# Patient Record
Sex: MALE | Race: BLACK OR AFRICAN AMERICAN | NOT HISPANIC OR LATINO | Employment: FULL TIME | URBAN - METROPOLITAN AREA
[De-identification: names, ages, dates, MRNs, and addresses within clinical notes are randomized per-mention and may not be internally consistent; named-entity substitution may affect disease eponyms.]

---

## 2018-05-30 DIAGNOSIS — M54.50 LUMBAR SPINE PAIN: Primary | ICD-10-CM

## 2018-06-05 ENCOUNTER — HOSPITAL ENCOUNTER (OUTPATIENT)
Dept: RADIOLOGY | Facility: HOSPITAL | Age: 23
Discharge: HOME | End: 2018-06-05
Attending: PHYSICIAN ASSISTANT
Payer: COMMERCIAL

## 2018-06-05 ENCOUNTER — OFFICE VISIT (OUTPATIENT)
Dept: ORTHOPEDICS | Facility: CLINIC | Age: 23
End: 2018-06-05
Attending: PHYSICIAN ASSISTANT
Payer: COMMERCIAL

## 2018-06-05 VITALS — BODY MASS INDEX: 33.39 KG/M2 | HEIGHT: 69 IN | WEIGHT: 225.44 LBS

## 2018-06-05 DIAGNOSIS — M43.00 SPONDYLOLYSIS: ICD-10-CM

## 2018-06-05 DIAGNOSIS — M54.50 LUMBAR SPINE PAIN: ICD-10-CM

## 2018-06-05 DIAGNOSIS — M43.10 SPONDYLOLISTHESIS, UNSPECIFIED SPINAL REGION: Primary | ICD-10-CM

## 2018-06-05 PROCEDURE — 99204 OFFICE O/P NEW MOD 45 MIN: CPT | Mod: S$GLB,,, | Performed by: PHYSICIAN ASSISTANT

## 2018-06-05 PROCEDURE — 72100 X-RAY EXAM L-S SPINE 2/3 VWS: CPT | Mod: 26,,, | Performed by: RADIOLOGY

## 2018-06-05 PROCEDURE — 99999 PR PBB SHADOW E&M-EST. PATIENT-LVL III: CPT | Mod: PBBFAC,,, | Performed by: PHYSICIAN ASSISTANT

## 2018-06-05 PROCEDURE — 3008F BODY MASS INDEX DOCD: CPT | Mod: CPTII,S$GLB,, | Performed by: PHYSICIAN ASSISTANT

## 2018-06-05 PROCEDURE — 72100 X-RAY EXAM L-S SPINE 2/3 VWS: CPT | Mod: TC

## 2018-06-05 PROCEDURE — 72120 X-RAY BEND ONLY L-S SPINE: CPT | Mod: 26,,, | Performed by: RADIOLOGY

## 2018-06-05 RX ORDER — METHOCARBAMOL 750 MG/1
750 TABLET, FILM COATED ORAL NIGHTLY PRN
Qty: 30 TABLET | Refills: 0 | Status: SHIPPED | OUTPATIENT
Start: 2018-06-05 | End: 2020-11-24

## 2018-06-05 NOTE — PROGRESS NOTES
DATE: 6/5/2018  PATIENT: Miguel Velasco    Supervising Physician: Antonio Berger M.D.    CHIEF COMPLAINT: back pain    HISTORY:  Miguel Velasco is a 22 y.o. 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 The pain has been present for 4 or 5 years.  He was diagnosed with isthmic spondylolisthesis of L5/S1 in high school.  He has not had any treatment.  The patient describes the pain as aching.  The pain is worse with getting out of bed and lying down and improved by activity. There is no associated numbness and tingling. There is no subjective weakness. Prior treatments have included nothing in particular, no NSAIDs, physical therapy, ESIs or surgery.    The patient denies myelopathic symptoms such as handwriting changes or difficulty with buttons/coins/keys. Denies perineal paresthesias, bowel/bladder dysfunction.    PAST MEDICAL/SURGICAL HISTORY:  History reviewed. No pertinent past medical history.  Past Surgical History:   Procedure Laterality Date    none         Current Medications:   Current Outpatient Prescriptions:     methocarbamol (ROBAXIN) 750 MG Tab, Take 1 tablet (750 mg total) by mouth nightly as needed. As needed for muscle spasms, Disp: 30 tablet, Rfl: 0    Social History:   Social History     Social History    Marital status: Single     Spouse name: N/A    Number of children: N/A    Years of education: N/A     Occupational History     [employer]     Social History Main Topics    Smoking status: Never Smoker    Smokeless tobacco: Never Used    Alcohol use Yes      Comment: ocassionally, maybe 1 drink/month    Drug use: No    Sexual activity: Yes     Partners: Female     Birth control/ protection: Condom, Pill     Other Topics Concern    Not on file     Social History Narrative    No narrative on file       REVIEW OF SYSTEMS:  Constitution: Negative. Negative for chills, fever and night sweats.   Cardiovascular:  "Negative for chest pain and syncope.   Respiratory: Negative for cough and shortness of breath.   Gastrointestinal: See HPI. Negative for nausea/vomiting. Negative for abdominal pain.  Genitourinary: See HPI. Negative for discoloration or dysuria.  Skin: Negative for dry skin, itching and rash.   Hematologic/Lymphatic: Negative for bleeding problem. Does not bruise/bleed easily.   Musculoskeletal: Negative for falls and muscle weakness.   Neurological: See HPI. No seizures.   Endocrine: Negative for polydipsia, polyphagia and polyuria.   Allergic/Immunologic: Negative for hives and persistent infections.    PHYSICAL EXAMINATION:    Ht 5' 9" (1.753 m)   Wt 102.3 kg (225 lb 6.7 oz)   BMI 33.29 kg/m²     General: The patient is a very pleasant 22 y.o. male in no apparent distress, the patient is oriented to person, place and time.   Psych: Normal mood and affect  HEENT: Vision grossly intact, hearing intact to the spoken word.  Lungs: Respirations unlabored.  Gait: Normal station and gait, no difficulty with toe or heel walk.   Skin: Dorsal lumbar skin negative for rashes, lesions, hairy patches and surgical scars.  Range of motion: Lumbar range of motion is acceptable. There is minimal lumbar tenderness to palpation.  Spinal Balance: Global saggital and coronal spinal balance acceptable, no significant for scoliosis and kyphosis.  Musculoskeletal: No pain with the range of motion of the bilateral hips. No trochanteric tenderness to palpation.  Vascular: Bilateral lower extremities warm and well perfused, Dorsalis pedis pulses 2+ bilaterally.  Neurological: Normal strength and tone in all major motor groups in the bilateral lower extremities. Normal sensation to light touch in the L2-S1 dermatomes bilaterally.  Deep tendon reflexes symmetric 2+ in the bilateral lower extremities.  Negative Babinski bilaterally.  Straight leg raise negative bilaterally.  Positive single leg hyperextension bilaterally.     IMAGING: "   Today I personally reviewed AP, Lat and Flex/Ex upright L-spine films that demonstrate grade I isthmic spondylolisthesis of L5/S1.         ASSESSMENT/PLAN:    Diagnoses and all orders for this visit:    Spondylolisthesis, unspecified spinal region  -     Ambulatory Referral to Physical/Occupational Therapy    Spondylolysis  -     Ambulatory Referral to Physical/Occupational Therapy    Other orders  -     methocarbamol (ROBAXIN) 750 MG Tab; Take 1 tablet (750 mg total) by mouth nightly as needed. As needed for muscle spasms    The patient would like to try some conservative treatment first.  Referral for PT placed today.  He will take OTC NSAIDs regularly as directed on the bottle.  He does not want a prescription anti-inflammatory.  Follow up after therapy if symptoms persist.    We discussed the department policy regarding pain medications.  Patient understands and agrees.       Follow up after therapy in about 6 weeks if symptoms persist, sooner with any new or worsening symptoms.     Follow-up if symptoms worsen or fail to improve.

## 2020-11-24 ENCOUNTER — OFFICE VISIT (OUTPATIENT)
Dept: CARDIOLOGY | Facility: CLINIC | Age: 25
End: 2020-11-24
Payer: COMMERCIAL

## 2020-11-24 VITALS
HEIGHT: 69 IN | DIASTOLIC BLOOD PRESSURE: 81 MMHG | BODY MASS INDEX: 38.82 KG/M2 | WEIGHT: 262.13 LBS | HEART RATE: 80 BPM | SYSTOLIC BLOOD PRESSURE: 129 MMHG

## 2020-11-24 DIAGNOSIS — R07.9 CHEST PAIN OF UNCERTAIN ETIOLOGY: Primary | ICD-10-CM

## 2020-11-24 PROCEDURE — 1125F PR PAIN SEVERITY QUANTIFIED, PAIN PRESENT: ICD-10-PCS | Mod: S$GLB,,, | Performed by: INTERNAL MEDICINE

## 2020-11-24 PROCEDURE — 99205 PR OFFICE/OUTPT VISIT, NEW, LEVL V, 60-74 MIN: ICD-10-PCS | Mod: 25,S$GLB,, | Performed by: INTERNAL MEDICINE

## 2020-11-24 PROCEDURE — 3008F PR BODY MASS INDEX (BMI) DOCUMENTED: ICD-10-PCS | Mod: CPTII,S$GLB,, | Performed by: INTERNAL MEDICINE

## 2020-11-24 PROCEDURE — 93000 EKG 12-LEAD: ICD-10-PCS | Mod: S$GLB,,, | Performed by: INTERNAL MEDICINE

## 2020-11-24 PROCEDURE — 99999 PR PBB SHADOW E&M-EST. PATIENT-LVL III: CPT | Mod: PBBFAC,,, | Performed by: INTERNAL MEDICINE

## 2020-11-24 PROCEDURE — 3008F BODY MASS INDEX DOCD: CPT | Mod: CPTII,S$GLB,, | Performed by: INTERNAL MEDICINE

## 2020-11-24 PROCEDURE — 99999 PR PBB SHADOW E&M-EST. PATIENT-LVL III: ICD-10-PCS | Mod: PBBFAC,,, | Performed by: INTERNAL MEDICINE

## 2020-11-24 PROCEDURE — 99205 OFFICE O/P NEW HI 60 MIN: CPT | Mod: 25,S$GLB,, | Performed by: INTERNAL MEDICINE

## 2020-11-24 PROCEDURE — 1125F AMNT PAIN NOTED PAIN PRSNT: CPT | Mod: S$GLB,,, | Performed by: INTERNAL MEDICINE

## 2020-11-24 PROCEDURE — 93000 ELECTROCARDIOGRAM COMPLETE: CPT | Mod: S$GLB,,, | Performed by: INTERNAL MEDICINE

## 2020-11-24 RX ORDER — PANTOPRAZOLE SODIUM 40 MG/1
40 TABLET, DELAYED RELEASE ORAL DAILY
Qty: 30 TABLET | Refills: 1 | Status: SHIPPED | OUTPATIENT
Start: 2020-11-24 | End: 2021-11-24

## 2020-11-24 NOTE — PROGRESS NOTES
Subjective:      Patient ID: Miguel Velasco is a 25 y.o. male.    Chief Complaint: Chest Pain    HPI:Pt c/o retrosternal chest pains onset a week ago.   The pains last from a few seconds to 30 minutes.  The pain is worse at night often.  Pt tried apple cider vinegar and Tylenol without much effect.  Burping may relieve the pain transiently.  No associated radiation or shortness of breath or sweating or vomiting.  The pain is not positional or pleuritic.    Pt exercises  Pt works out 5 days a week  Pt lifts weights up to pressing 385 lbs  There is no hx of chest pain with exertion.    Review of Systems   Cardiovascular: Positive for chest pain. Negative for claudication, dyspnea on exertion, irregular heartbeat, leg swelling, near-syncope, orthopnea, palpitations and syncope.      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  Reviewed L5 S1 anterolisthesis and L5 spondolysis on X rays 2018    History reviewed. No pertinent past medical history.     Past Surgical History:   Procedure Laterality Date    none         Family History   Problem Relation Age of Onset    No Known Problems Mother     No Known Problems Father     No Known Problems Brother        Social History     Socioeconomic History    Marital status: Single     Spouse name: Not on file    Number of children: Not on file    Years of education: Not on file    Highest education level: Not on file   Occupational History     Employer: [employer]   Social Needs    Financial resource strain: Not on file    Food insecurity     Worry: Not on file     Inability: Not on file    Transportation needs     Medical: Not on file     Non-medical: Not on file   Tobacco Use    Smoking status: Never Smoker    Smokeless tobacco: Never Used   Substance and Sexual Activity    Alcohol use: Yes     Comment:  "ocassionally, maybe 1 drink/month    Drug use: No    Sexual activity: Yes     Partners: Female     Birth control/protection: Condom, Pill   Lifestyle    Physical activity     Days per week: Not on file     Minutes per session: Not on file    Stress: Not on file   Relationships    Social connections     Talks on phone: Not on file     Gets together: Not on file     Attends Jewish service: Not on file     Active member of club or organization: Not on file     Attends meetings of clubs or organizations: Not on file     Relationship status: Not on file   Other Topics Concern    Not on file   Social History Narrative    Not on file       Current Outpatient Medications on File Prior to Visit   Medication Sig Dispense Refill    [DISCONTINUED] methocarbamol (ROBAXIN) 750 MG Tab Take 1 tablet (750 mg total) by mouth nightly as needed. As needed for muscle spasms 30 tablet 0     No current facility-administered medications on file prior to visit.        Review of patient's allergies indicates:  No Known Allergies  Objective:     Vitals:    11/24/20 0814   BP: 129/81   BP Location: Left arm   Patient Position: Sitting   BP Method: Thigh Cuff (Automatic)   Pulse: 80   Weight: 118.9 kg (262 lb 2 oz)   Height: 5' 9" (1.753 m)        Physical Exam   Constitutional: He is oriented to person, place, and time. He appears well-developed and well-nourished. No distress.   Eyes: No scleral icterus.   Neck: No JVD present. Carotid bruit is not present.   Cardiovascular: Regular rhythm and normal heart sounds. Exam reveals no gallop and no friction rub.   No murmur heard.  Pulses:       Dorsalis pedis pulses are 2+ on the right side and 2+ on the left side.   Pulmonary/Chest: Effort normal and breath sounds normal. No respiratory distress.   Abdominal: Soft. He exhibits no abdominal bruit, no pulsatile midline mass and no mass. There is no hepatosplenomegaly. There is no abdominal tenderness.   Musculoskeletal:         General: " No edema.   Neurological: He is alert and oriented to person, place, and time.   Skin: Skin is warm and dry. He is not diaphoretic.   Psychiatric: He has a normal mood and affect. His behavior is normal. Judgment and thought content normal.   Vitals reviewed.  Anterior chest wall is not tender    ECG: sinus bradycardia at 58 bpm, WNL, reviewed by me    CBC and CMP and lipid profile in 2016 WNL    Ultrasound of abdomen showed hepatic steatosis    X ray of lumbar spine    No visits with results within 6 Month(s) from this visit.   Latest known visit with results is:   Lab Visit on 03/14/2016   Component Date Value Ref Range Status    WBC 03/14/2016 4.63  3.90 - 12.70 K/uL Final    RBC 03/14/2016 5.22  4.60 - 6.20 M/uL Final    Hemoglobin 03/14/2016 17.1  14.0 - 18.0 g/dL Final    Hematocrit 03/14/2016 50.4  40.0 - 54.0 % Final    MCV 03/14/2016 97  82 - 98 fL Final    MCH 03/14/2016 32.8* 27.0 - 31.0 pg Final    MCHC 03/14/2016 33.9  32.0 - 36.0 % Final    RDW 03/14/2016 12.9  11.5 - 14.5 % Final    Platelets 03/14/2016 235  150 - 350 K/uL Final    MPV 03/14/2016 10.4  9.2 - 12.9 fL Final    Gran # (ANC) 03/14/2016 2.7  1.8 - 7.7 K/uL Final    Lymph # 03/14/2016 1.4  1.0 - 4.8 K/uL Final    Mono # 03/14/2016 0.3  0.3 - 1.0 K/uL Final    Eos # 03/14/2016 0.2  0.0 - 0.5 K/uL Final    Baso # 03/14/2016 0.01  0.00 - 0.20 K/uL Final    Gran % 03/14/2016 58.1  38.0 - 73.0 % Final    Lymph % 03/14/2016 30.7  18.0 - 48.0 % Final    Mono % 03/14/2016 7.1  4.0 - 15.0 % Final    Eosinophil % 03/14/2016 3.7  0.0 - 8.0 % Final    Basophil % 03/14/2016 0.2  0.0 - 1.9 % Final    Differential Method 03/14/2016 Automated   Final    Sodium 03/14/2016 137  136 - 145 mmol/L Final    Potassium 03/14/2016 4.9  3.5 - 5.1 mmol/L Final    Chloride 03/14/2016 103  95 - 110 mmol/L Final    CO2 03/14/2016 23  23 - 29 mmol/L Final    Glucose 03/14/2016 79  70 - 110 mg/dL Final    BUN 03/14/2016 12  6 - 20 mg/dL Final     Creatinine 03/14/2016 1.3  0.5 - 1.4 mg/dL Final    Calcium 03/14/2016 9.8  8.7 - 10.5 mg/dL Final    Total Protein 03/14/2016 7.1  6.0 - 8.4 g/dL Final    Albumin 03/14/2016 4.0  3.5 - 5.2 g/dL Final    Total Bilirubin 03/14/2016 0.4  0.1 - 1.0 mg/dL Final    Alkaline Phosphatase 03/14/2016 76  55 - 135 U/L Final    AST 03/14/2016 18  10 - 40 U/L Final    ALT 03/14/2016 13  10 - 44 U/L Final    Anion Gap 03/14/2016 11  8 - 16 mmol/L Final    eGFR if African American 03/14/2016 >60.0  >60 mL/min/1.73 m^2 Final    eGFR if non African American 03/14/2016 >60.0  >60 mL/min/1.73 m^2 Final    Lipase 03/14/2016 15  4 - 60 U/L Final    Cholesterol 03/14/2016 151  120 - 199 mg/dL Final    Triglycerides 03/14/2016 70  30 - 150 mg/dL Final    HDL 03/14/2016 51  40 - 75 mg/dL Final    LDL Cholesterol 03/14/2016 86.0  63.0 - 159.0 mg/dL Final    HDL/Cholesterol Ratio 03/14/2016 33.8  20.0 - 50.0 % Final    Total Cholesterol/HDL Ratio 03/14/2016 3.0  2.0 - 5.0 Final    Non-HDL Cholesterol 03/14/2016 100  mg/dL Final    HIV 1/2 Ag/Ab 03/14/2016 Negative  Negative Final    Chlamydia, Amplified DNA 03/14/2016 Negative   Final    N gonorrhoeae, amplified DNA 03/14/2016 Negative   Final    RPR 03/14/2016 Non-reactive  Non-reactive Final    Sed Rate 03/14/2016 0  0 - 10 mm/Hr Final    Troponin I 03/14/2016 <0.006  0.000 - 0.026 ng/mL Final   (    Assessment:     1. Chest pain of uncertain etiology      Plan:   Miguel was seen today for chest pain.    Diagnoses and all orders for this visit:    Chest pain of uncertain etiology  -     IN OFFICE EKG 12-LEAD (to Muse)     Suspect musculoskeletal chest wall pain from lifting weights    Possible gastritis or esophagitis or peptic ulcer disease    Empiric Tylenol and rest chest (avoid weight lifting until pain abates) and pantoprazole for a month    Avoid alcohol and NSAID's and ASA     Consider EGD if symptoms persist    Pt reassured    RTC one month    No  follow-ups on file.

## 2021-02-18 ENCOUNTER — CLINICAL SUPPORT (OUTPATIENT)
Dept: URGENT CARE | Facility: CLINIC | Age: 26
End: 2021-02-18
Payer: COMMERCIAL

## 2021-02-18 DIAGNOSIS — Z78.9 NO KNOWN HEALTH PROBLEMS: Primary | ICD-10-CM

## 2021-02-18 LAB
CTP QC/QA: YES
SARS-COV-2 RDRP RESP QL NAA+PROBE: NEGATIVE

## 2021-02-18 PROCEDURE — 99211 PR OFFICE/OUTPT VISIT, EST, LEVL I: ICD-10-PCS | Mod: S$GLB,,, | Performed by: PHYSICIAN ASSISTANT

## 2021-02-18 PROCEDURE — U0002 COVID-19 LAB TEST NON-CDC: HCPCS | Mod: QW,S$GLB,, | Performed by: PHYSICIAN ASSISTANT

## 2021-02-18 PROCEDURE — U0002: ICD-10-PCS | Mod: QW,S$GLB,, | Performed by: PHYSICIAN ASSISTANT

## 2021-02-18 PROCEDURE — 99211 OFF/OP EST MAY X REQ PHY/QHP: CPT | Mod: S$GLB,,, | Performed by: PHYSICIAN ASSISTANT

## 2021-03-13 ENCOUNTER — CLINICAL SUPPORT (OUTPATIENT)
Dept: URGENT CARE | Facility: CLINIC | Age: 26
End: 2021-03-13
Payer: COMMERCIAL

## 2021-03-13 DIAGNOSIS — Z20.822 ENCOUNTER FOR LABORATORY TESTING FOR COVID-19 VIRUS: Primary | ICD-10-CM

## 2021-03-13 LAB
CTP QC/QA: YES
SARS-COV-2 RDRP RESP QL NAA+PROBE: NEGATIVE

## 2021-03-13 PROCEDURE — U0002 COVID-19 LAB TEST NON-CDC: HCPCS | Mod: QW,S$GLB,, | Performed by: NURSE PRACTITIONER

## 2021-03-13 PROCEDURE — U0002: ICD-10-PCS | Mod: QW,S$GLB,, | Performed by: NURSE PRACTITIONER

## 2021-04-16 ENCOUNTER — PATIENT MESSAGE (OUTPATIENT)
Dept: RESEARCH | Facility: HOSPITAL | Age: 26
End: 2021-04-16

## 2021-07-23 ENCOUNTER — OFFICE VISIT (OUTPATIENT)
Dept: URGENT CARE | Facility: CLINIC | Age: 26
End: 2021-07-23
Payer: COMMERCIAL

## 2021-07-23 VITALS
SYSTOLIC BLOOD PRESSURE: 136 MMHG | WEIGHT: 255 LBS | TEMPERATURE: 98 F | OXYGEN SATURATION: 98 % | DIASTOLIC BLOOD PRESSURE: 74 MMHG | RESPIRATION RATE: 20 BRPM | HEART RATE: 63 BPM | BODY MASS INDEX: 35.7 KG/M2 | HEIGHT: 71 IN

## 2021-07-23 DIAGNOSIS — J06.9 VIRAL URI: ICD-10-CM

## 2021-07-23 DIAGNOSIS — Z11.59 SCREENING FOR VIRAL DISEASE: Primary | ICD-10-CM

## 2021-07-23 LAB
CTP QC/QA: YES
SARS-COV-2 RDRP RESP QL NAA+PROBE: NEGATIVE

## 2021-07-23 PROCEDURE — U0002: ICD-10-PCS | Mod: QW,S$GLB,, | Performed by: PHYSICIAN ASSISTANT

## 2021-07-23 PROCEDURE — 99214 OFFICE O/P EST MOD 30 MIN: CPT | Mod: S$GLB,CS,, | Performed by: PHYSICIAN ASSISTANT

## 2021-07-23 PROCEDURE — 3008F PR BODY MASS INDEX (BMI) DOCUMENTED: ICD-10-PCS | Mod: CPTII,S$GLB,, | Performed by: PHYSICIAN ASSISTANT

## 2021-07-23 PROCEDURE — 99214 PR OFFICE/OUTPT VISIT, EST, LEVL IV, 30-39 MIN: ICD-10-PCS | Mod: S$GLB,CS,, | Performed by: PHYSICIAN ASSISTANT

## 2021-07-23 PROCEDURE — U0002 COVID-19 LAB TEST NON-CDC: HCPCS | Mod: QW,S$GLB,, | Performed by: PHYSICIAN ASSISTANT

## 2021-07-23 PROCEDURE — 3008F BODY MASS INDEX DOCD: CPT | Mod: CPTII,S$GLB,, | Performed by: PHYSICIAN ASSISTANT

## 2021-07-23 RX ORDER — PREDNISONE 20 MG/1
40 TABLET ORAL DAILY
Qty: 6 TABLET | Refills: 0 | Status: SHIPPED | OUTPATIENT
Start: 2021-07-23 | End: 2021-07-26

## 2022-01-06 ENCOUNTER — OFFICE VISIT (OUTPATIENT)
Dept: URGENT CARE | Facility: CLINIC | Age: 27
End: 2022-01-06

## 2022-01-06 VITALS
DIASTOLIC BLOOD PRESSURE: 81 MMHG | HEART RATE: 72 BPM | BODY MASS INDEX: 36.4 KG/M2 | RESPIRATION RATE: 18 BRPM | TEMPERATURE: 98 F | OXYGEN SATURATION: 98 % | WEIGHT: 260 LBS | HEIGHT: 71 IN | SYSTOLIC BLOOD PRESSURE: 125 MMHG

## 2022-01-06 DIAGNOSIS — R05.9 COUGH: Primary | ICD-10-CM

## 2022-01-06 DIAGNOSIS — U07.1 COVID-19 VIRUS DETECTED: ICD-10-CM

## 2022-01-06 DIAGNOSIS — U07.1 LAB TEST POSITIVE FOR DETECTION OF COVID-19 VIRUS: ICD-10-CM

## 2022-01-06 LAB
CTP QC/QA: YES
SARS-COV-2 RDRP RESP QL NAA+PROBE: POSITIVE

## 2022-01-06 PROCEDURE — U0002 COVID-19 LAB TEST NON-CDC: HCPCS | Mod: QW,S$GLB,, | Performed by: FAMILY MEDICINE

## 2022-01-06 PROCEDURE — 99213 PR OFFICE/OUTPT VISIT, EST, LEVL III, 20-29 MIN: ICD-10-PCS | Mod: S$GLB,,, | Performed by: FAMILY MEDICINE

## 2022-01-06 PROCEDURE — U0002: ICD-10-PCS | Mod: QW,S$GLB,, | Performed by: FAMILY MEDICINE

## 2022-01-06 PROCEDURE — 99213 OFFICE O/P EST LOW 20 MIN: CPT | Mod: S$GLB,,, | Performed by: FAMILY MEDICINE

## 2022-01-06 NOTE — LETTER
January 6, 2022      [facility]  [address] [address]  Phone: [phone]  Fax: [phone]       Patient: Miguel Velasco   YOB: 1995  Date of Visit: 01/06/2022    To Whom It May Concern:    Leidy Velasco  was at [facility] on 01/06/2022. The patient may return to work/school on 1/10/22   with no restrictions. If you have any questions or concerns, or if I can be of further assistance, please do not hesitate to contact me.    Sincerely,    Jodee Erickson MD

## 2022-01-06 NOTE — PATIENT INSTRUCTIONS
Please isolate yourself at home.  You may leave home and/or return to work once the following conditions are met:    If you were not hospitalized and are not moderately to severely immunocompromised:    More than 5 days since symptoms first appeared AND   More than 24 hours fever free without medications AND   Symptoms are improving   Continue to wear a mask around others for 5 additional days.    If you were hospitalized OR are moderately to severely immunocompromised:   More than 20 days since symptoms first appeared   More than 24 hours fever free without medications   Symptoms have improved    If you had no symptoms but tested positive:   More than 5 days since the date of the first positive test (20 days if moderately to severely immunocompromised). If you develop symptoms, then use the guidelines above.   Continue to wear a mask around others for 5 additional days.      Contact Tracing    As one of the next steps, you will receive a call or text from the [place] Department of Health ([facility]) COVID-19 Tracing Team. See the contact information below so you know not to ignore the health departments call. It is important that you contact them back immediately so they can help.      Contact Tracer Number:  [phone]  Caller ID for most carriers: [facility]     What is contact tracing?  · Contact tracing is a process that helps identify everyone who has been in close contact with an infected person. Contact tracers let those people know they may have been exposed and guide them on next steps. Confidentiality is important for everyone; no one will be told who may have exposed them to the virus.  · Your involvement is important. The more we know about where and how this virus is spreading, the better chance we have at stopping it from spreading further.  What does exposure mean?  · Exposure means you have been within 6 feet for more than 15 minutes with a person who has or had COVID-19.  What kind of  questions do the contact tracers ask?  · A contact tracer will confirm your basic contact information including name, address, phone number, and next of kin, as well as asking about any symptoms you may have had. Theyll also ask you how you think you may have gotten sick, such as places where you may have been exposed to the virus, and people you were with. Those names will never be shared with anyone outside of that call, and will only be used to help trace and stop the spread of the virus.   I have privacy concerns. How will the state use my information?  · Your privacy about your health is important. All calls are completed using call centers that use the appropriate health privacy protection measures (HIPAA compliance), meaning that your patient information is safe. No one will ever ask you any questions related to immigration status. Your health comes first.   Do I have to participate?  · You do not have to participate, but we strongly encourage you to. Contact tracing can help us catch and control new outbreaks as theyre developing to keep your friends and family safe.   What if I dont hear from anyone?  · If you dont receive a call within 24 hours, you can call the number above right away to inquire about your status. That line is open from 8:00 am - 8:00 p.m., 7 days a week.  Contact tracing saves lives! Together, we have the power to beat this virus and keep our loved ones and neighbors safe.    For more information see CDC link below.      https://www.cdc.gov/coronavirus/2019-ncov/hcp/guidance-prevent-spread.html#precautions        Sources:  [facility], [place] Department [facility] and [facility]

## 2022-01-06 NOTE — PROGRESS NOTES
"Subjective:       Patient ID: Miguel Velasco is a 26 y.o. male.    Vitals:  height is 5' 11" (1.803 m) and weight is 117.9 kg (260 lb). His temporal temperature is 98 °F (36.7 °C). His blood pressure is 125/81 and his pulse is 72. His respiration is 18 and oxygen saturation is 98%.     Chief Complaint: Cough    Pt was not exposed to covid that he knows of  Patient is vaccinated x1   Patient is having low-grade fever sore throat and body ache for last couple to 3 days no nausea vomiting or diarrhea    Cough  This is a new problem. Episode onset: 2-3 days ago. The problem has been gradually worsening. The problem occurs every few minutes. The cough is productive of sputum. Associated symptoms include myalgias. Pertinent negatives include no fever, headaches or sore throat. The symptoms are aggravated by cold air. He has tried nothing for the symptoms. There is no history of asthma, bronchitis or pneumonia.       Constitution: Negative for fever.   HENT: Positive for congestion. Negative for sinus pain, sinus pressure and sore throat.    Respiratory: Positive for cough and sputum production.    Musculoskeletal: Positive for muscle ache.   Neurological: Negative for headaches.       Objective:      Physical Exam   Constitutional: He is oriented to person, place, and time. He appears well-developed and well-nourished. He is cooperative.  Non-toxic appearance. He does not appear ill. No distress.   HENT:   Head: Normocephalic and atraumatic.   Ears:   Right Ear: Hearing, tympanic membrane, external ear and ear canal normal.   Left Ear: Hearing, tympanic membrane, external ear and ear canal normal.   Nose: Nose normal. No mucosal edema, rhinorrhea or nasal deformity. No epistaxis. Right sinus exhibits no maxillary sinus tenderness and no frontal sinus tenderness. Left sinus exhibits no maxillary sinus tenderness and no frontal sinus tenderness.   Mouth/Throat: Uvula is midline, oropharynx is clear and moist and " mucous membranes are normal. Mucous membranes are moist. No trismus in the jaw. Normal dentition. No uvula swelling. No posterior oropharyngeal erythema. Oropharynx is clear.   Eyes: Conjunctivae and lids are normal. Pupils are equal, round, and reactive to light. Right eye exhibits no discharge. Left eye exhibits no discharge. No scleral icterus.      extraocular movement intact   Neck: Trachea normal and phonation normal. Neck supple.   Cardiovascular: Normal rate, regular rhythm, normal heart sounds, intact distal pulses and normal pulses.   Pulmonary/Chest: Effort normal and breath sounds normal. No respiratory distress.   Abdominal: Normal appearance and bowel sounds are normal. He exhibits no distension, no pulsatile midline mass and no mass. Soft. There is no abdominal tenderness.   Musculoskeletal: Normal range of motion.         General: No deformity or edema. Normal range of motion.   Neurological: He is alert and oriented to person, place, and time. He exhibits normal muscle tone. Coordination normal.   Skin: Skin is warm, dry, intact, not diaphoretic and not pale.   Psychiatric: He has a normal mood and affect. His speech is normal and behavior is normal. Judgment and thought content normal. Cognition and memory  Nursing note and vitals reviewed.        Assessment:       1. Cough    2. Lab test positive for detection of COVID-19 virus          Plan:         Cough  -     POCT COVID-19 Rapid Screening    Lab test positive for detection of COVID-19 virus               Results for orders placed or performed in visit on 01/06/22   POCT COVID-19 Rapid Screening   Result Value Ref Range    POC Rapid COVID Positive (A) Negative     Acceptable Yes          symptomatic treatment with Tylenol or ibuprofen Claritin once

## 2024-11-12 ENCOUNTER — OFFICE VISIT (OUTPATIENT)
Dept: ORTHOPEDICS | Facility: CLINIC | Age: 29
End: 2024-11-12
Payer: COMMERCIAL

## 2024-11-12 ENCOUNTER — HOSPITAL ENCOUNTER (OUTPATIENT)
Dept: RADIOLOGY | Facility: HOSPITAL | Age: 29
Discharge: HOME | End: 2024-11-12
Payer: COMMERCIAL

## 2024-11-12 VITALS — HEIGHT: 71 IN | WEIGHT: 236.69 LBS | BODY MASS INDEX: 33.14 KG/M2

## 2024-11-12 DIAGNOSIS — M25.461 EFFUSION OF RIGHT KNEE: Primary | ICD-10-CM

## 2024-11-12 DIAGNOSIS — M25.561 ACUTE PAIN OF RIGHT KNEE: ICD-10-CM

## 2024-11-12 DIAGNOSIS — M25.461 EFFUSION OF RIGHT KNEE: ICD-10-CM

## 2024-11-12 LAB
APPEARANCE FLD: NORMAL
BODY FLD TYPE: NORMAL
COLOR FLD: NORMAL
LYMPHOCYTES NFR FLD MANUAL: 1 %
MONOS+MACROS NFR FLD MANUAL: 5 %
NEUTROPHILS NFR FLD MANUAL: 94 %
WBC # FLD: NORMAL /CU MM

## 2024-11-12 PROCEDURE — 87070 CULTURE OTHR SPECIMN AEROBIC: CPT

## 2024-11-12 PROCEDURE — 99999 PR PBB SHADOW E&M-EST. PATIENT-LVL III: CPT | Mod: PBBFAC,,,

## 2024-11-12 PROCEDURE — 99214 OFFICE O/P EST MOD 30 MIN: CPT | Mod: S$GLB,,,

## 2024-11-12 PROCEDURE — 89051 BODY FLUID CELL COUNT: CPT

## 2024-11-12 PROCEDURE — 1159F MED LIST DOCD IN RCRD: CPT | Mod: CPTII,S$GLB,,

## 2024-11-12 PROCEDURE — 73564 X-RAY EXAM KNEE 4 OR MORE: CPT | Mod: TC,RT

## 2024-11-12 PROCEDURE — 87075 CULTR BACTERIA EXCEPT BLOOD: CPT

## 2024-11-12 PROCEDURE — 1160F RVW MEDS BY RX/DR IN RCRD: CPT | Mod: CPTII,S$GLB,,

## 2024-11-12 PROCEDURE — 87205 SMEAR GRAM STAIN: CPT

## 2024-11-12 PROCEDURE — 73564 X-RAY EXAM KNEE 4 OR MORE: CPT | Mod: 26,RT,, | Performed by: RADIOLOGY

## 2024-11-12 PROCEDURE — 73562 X-RAY EXAM OF KNEE 3: CPT | Mod: 26,59,LT, | Performed by: RADIOLOGY

## 2024-11-12 PROCEDURE — 3008F BODY MASS INDEX DOCD: CPT | Mod: CPTII,S$GLB,,

## 2024-11-12 NOTE — PROGRESS NOTES
"  SUBJECTIVE:     Chief Complaint & History of Present Illness:  Miguel Velasco is a 28 y.o. male who presents today for evaluation of his right knee pain and swelling that has been persistent in nature since November 9th, 2024.  He denies any specific accidents or injuries but notes bending down in a regular fashion and feeling immediate pain and swelling to the superior aspect of his right knee.  He denies any mechanical symptoms like clicking or popping but notes intermittent feelings of instability.  He denies feeling as though his patella dislocated/subluxed.  He was evaluated on the same day at [facility] where x-rays were obtained and negative for acute fractures.  He was discharged home with supportive care and advised to follow up closely with orthopedics.  He has been alternating Tylenol and ibuprofen along with utilizing an Ace wrap with mild relief of his symptoms.  He reports a right-sided limp and presents today without the use of assistive devices.    No past medical history on file.    Past Surgical History:   Procedure Laterality Date    none         Family History   Problem Relation Name Age of Onset    No Known Problems Mother      No Known Problems Father      No Known Problems Brother         Review of patient's allergies indicates:  No Known Allergies        Current Outpatient Medications:     pantoprazole (PROTONIX) 40 MG tablet, Take 1 tablet (40 mg total) by mouth once daily., Disp: 30 tablet, Rfl: 1      Review of Systems:  ROS:  The updated medical history is in the chart and has been reviewed. A review of systems is updated and there is no reported vision changes, ear/nose/mouth/throat complaints, chest pain, shortness of breath, abdominal pain, urological complaints, fevers or chills, psychiatric complaints. Musculoskeletal and neurologcial symptoms are as documented. All other systems are negative.      OBJECTIVE:     PHYSICAL EXAM:  Ht 5' 11" (1.803 m)   Wt 107.3 " kg (236 lb 10.6 oz)   BMI 33.01 kg/m²   General: Pleasant, cooperative, NAD.  HEENT: NCAT, sclera nonicteric.  Lungs: Respirations are equal and unlabored.   Abdomen: Soft and non-tender.  CV: 2+ bilateral upper and lower extremity pulses.  Neuro: Sensation intact to light touch.  Skin: Intact throughout LE with no rashes, erythema, or lesions.  Extremities: No LE edema, NVI lower extremities. antalgic gait.    right Knee Exam:  Knee Range of Motion: 0-90 pain at extremes of motion   Effusion: moderate  Condition of skin: intact  Location of tenderness: Medial joint line, Lateral joint line, Patella, and Patellar tendon   Strength: 4/5 quadriceps strength, 5/5 gastroc-soleus strength, 5/5 hamstring strength, and 5/5 tibialis anterior strength  Stability: stable to testing  Knee Alignment: normal  Aida: negative     left Knee Exam:  Knee Range of Motion: full   Effusion: none  Condition of skin: intact  Location of tenderness: None   Strength: 5/5 quadriceps strength, 5/5 gastroc-soleus strength, 5/5 hamstring strength, and 5/5 tibialis anterior strength  Knee alignment: normal  Stability: stable to testing  Aida: negative/negative      RADIOGRAPHS:  X-rays of the right knee taken today personally reviewed. Imaging reveals no acute fractures dislocations.  There is satisfactory preservation of joint spacing.      ASSESSMENT:       ICD-10-CM ICD-9-CM   1. Effusion of right knee  M25.461 719.06   2. Acute pain of right knee  M25.561 719.46       PLAN:     We discussed with the patient at length all the different treatment options available including anti-inflammatories, acetaminophen, rest, ice, knee strengthening exercise, occasional cortisone injections for temporary relief, Viscosupplimentation injections, arthroscopic debridement, osteotomy, and finally knee arthroplasty.     We discussed his findings and treatment options.  He has a significant effusion on exam today with limitations in his active and  passive range of motion.  He tolerated and aspiration in the office today.  Given his acute findings, I have recommended further evaluation with MRI scan of the right knee.  I will follow him closely once the results of his scan is received.    He will continue to maintain conservative treatment measures at this time with oral anti-inflammatory medication as needed, frequent elevation, compression, and icing to control his swelling.    Knee Arthrocentesis Procedure Note  Diagnosis: right knee effusion  Indications: Knee pain  Procedure Details: Verbal consent obtained and allergies reviewed. Area prepped with alcohol.  An 18 gauge needle was inserted into superolateral aspect of knee. 120 ml of grossly bloody was removed from the joint and sent to the lab for analysis. The needle was removed and the area was cleansed and dressed.    Complications: None.          DISCLAIMER: This note was prepared with [employer] voice recognition transcription software. Garbled syntax, mangled pronouns, and other bizarre constructions may be attributed to that software system.    Pedro Palmer PA-C

## 2024-11-14 LAB — BACTERIA SPEC ANAEROBE CULT: NORMAL

## 2024-11-16 LAB
BACTERIA FLD AEROBE CULT: NO GROWTH
GRAM STN SPEC: NORMAL
GRAM STN SPEC: NORMAL

## 2024-11-18 ENCOUNTER — TELEPHONE (OUTPATIENT)
Dept: ORTHOPEDICS | Facility: CLINIC | Age: 29
End: 2024-11-18
Payer: COMMERCIAL

## 2024-11-18 ENCOUNTER — HOSPITAL ENCOUNTER (OUTPATIENT)
Dept: RADIOLOGY | Facility: HOSPITAL | Age: 29
Discharge: HOME | End: 2024-11-18
Attending: NURSE PRACTITIONER
Payer: COMMERCIAL

## 2024-11-18 DIAGNOSIS — M79.89 RIGHT LEG SWELLING: Primary | ICD-10-CM

## 2024-11-18 DIAGNOSIS — M79.89 RIGHT LEG SWELLING: ICD-10-CM

## 2024-11-18 PROCEDURE — 93971 EXTREMITY STUDY: CPT | Mod: TC,RT

## 2024-11-18 PROCEDURE — 93971 EXTREMITY STUDY: CPT | Mod: 26,RT,, | Performed by: RADIOLOGY

## 2024-11-18 NOTE — TELEPHONE ENCOUNTER
Scheduled pt for US to r/o DVT today at [facility]. Rescheduled MRI to sooner appt for tomorrow at the [facility].

## 2024-11-18 NOTE — PROGRESS NOTES
Pt had the right knee aspirated last week. 120ml of grossly bloody fluid. He called this morning with lower leg swelling. Concern for dvt, so stat ultrasound ordered. Will try to move his MRI, currently scheduled on 12/1, to a sooner date.

## 2024-11-18 NOTE — TELEPHONE ENCOUNTER
----- Message from Med Assistant Vizcaino sent at 11/18/2024  9:17 AM CST -----  Regarding: FW: Appt  Contact: Reiwillis @[phone]    ----- Message -----  From: Henri Toro MA  Sent: 11/18/2024   8:42 AM CST  To: Charis Schaefer MA; Lore Gutierrez MA  Subject: FW: Appt                                         Can sarah beth or ace see this patient today? Pedro aspirated his knee last week. Might be a DVT if swelling is bad. Please advise  ----- Message -----  From: Jose Epperson  Sent: 11/18/2024   8:32 AM CST  To: Spencer Vasquez Staff  Subject: Appt                                             Reiwillis/Significant other is requesting call back, pt is having right knee/leg swelling, pt was seen 11/12 for drainage, Rafael is requesting call back pt would like to be seen today if possible, added pt to schedule for 11/19, but would like appt today, please call @[phone]

## 2024-11-19 ENCOUNTER — HOSPITAL ENCOUNTER (OUTPATIENT)
Dept: RADIOLOGY | Facility: HOSPITAL | Age: 29
Discharge: HOME | End: 2024-11-19
Payer: COMMERCIAL

## 2024-11-19 DIAGNOSIS — M25.461 EFFUSION OF RIGHT KNEE: ICD-10-CM

## 2024-11-19 PROCEDURE — 73721 MRI JNT OF LWR EXTRE W/O DYE: CPT | Mod: 26,RT,, | Performed by: RADIOLOGY

## 2024-11-19 PROCEDURE — 73721 MRI JNT OF LWR EXTRE W/O DYE: CPT | Mod: TC,RT

## 2024-11-22 ENCOUNTER — TELEPHONE (OUTPATIENT)
Dept: ORTHOPEDICS | Facility: CLINIC | Age: 29
End: 2024-11-22
Payer: COMMERCIAL

## 2024-11-22 DIAGNOSIS — S86.111A STRAIN OF RIGHT GASTROCNEMIUS MUSCLE, INITIAL ENCOUNTER: Primary | ICD-10-CM

## 2024-11-22 NOTE — TELEPHONE ENCOUNTER
Called the patient this morning to discuss the results of his recent right knee MRI scan.  He is aware he has a grade 1/2 medial gastrocnemius strain but is overall ligamentously stable.  He has some continued right lower extremity swelling and discomfort but overall not causing him significant functional limitations.  He is continuing to work with only mild discomfort.  He was advised to continue applying compressive wrap/sleeve, icing, and elevating frequently.  He does have a component of a hemarthrosis on MRI. 120 cc of bloody fluid was aspirated recently.  He was advised to monitor his swelling and if his symptoms persist or worsen, he could consider possible repeat aspiration.  He may continue range of motion and activity as tolerated within reason.  I would like to monitor symptoms closely and he will touch base next week to provide an update.  We can get him in the office sooner if his symptoms worsen.

## 2025-07-14 ENCOUNTER — CLINICAL SUPPORT (OUTPATIENT)
Dept: REHABILITATION | Facility: HOSPITAL | Age: 30
End: 2025-07-14

## 2025-07-14 DIAGNOSIS — M54.42 CHRONIC LEFT-SIDED LOW BACK PAIN WITH LEFT-SIDED SCIATICA: Primary | ICD-10-CM

## 2025-07-14 DIAGNOSIS — M25.652 DECREASED RANGE OF MOTION OF BOTH HIPS: ICD-10-CM

## 2025-07-14 DIAGNOSIS — G89.29 CHRONIC LEFT-SIDED LOW BACK PAIN WITH LEFT-SIDED SCIATICA: Primary | ICD-10-CM

## 2025-07-14 DIAGNOSIS — R29.898 WEAKNESS OF LEFT LOWER EXTREMITY: ICD-10-CM

## 2025-07-14 DIAGNOSIS — M25.651 DECREASED RANGE OF MOTION OF BOTH HIPS: ICD-10-CM

## 2025-07-14 PROCEDURE — 97161 PT EVAL LOW COMPLEX 20 MIN: CPT

## 2025-07-14 PROCEDURE — 97110 THERAPEUTIC EXERCISES: CPT

## 2025-07-14 NOTE — PATIENT INSTRUCTIONS
Access Code: [email or web address]  URL: [email or web address]  Date: 07/14/2025  Prepared by: Sky Butcher    Exercises  - Static Prone on Elbows  - 1 x daily - 7 x weekly - 2 sets - 3 minutes hold  - Prone Press Up  - 1 x daily - 7 x weekly - 2 sets - 10 reps - 3 seconds hold  - Sidelying Lumbar Rotation Stretch  - 1 x daily - 7 x weekly - 3 sets - 10 reps  - Supine Piriformis Stretch with Foot on Ground  - 1 x daily - 7 x weekly - 4 sets - 30 seconds hold  - Supine Bridge  - 1 x daily - 7 x weekly - 3 sets - 10 reps - 1 seconds hold  - Sidelying Hip Abduction  - 1 x daily - 7 x weekly - 3 sets - 10 reps  - Standing Lumbar Extension with Counter  - 1 x daily - 7 x weekly - 2 sets - 10 reps - 3 seconds hold  - Supine 90/90 Sciatic Nerve Glide with Knee Flexion/Extension  - 1 x daily - 7 x weekly - 2 sets - 10 reps - 2 seconds hold

## 2025-07-14 NOTE — PROGRESS NOTES
Outpatient Rehab    Physical Therapy Evaluation    Patient Name: Miguel Velasco  MRN: [id number]  YOB: 1995  Encounter Date: 7/14/2025    Therapy Diagnosis:   Encounter Diagnoses   Name Primary?    Chronic left-sided low back pain with left-sided sciatica Yes    Weakness of left lower extremity     Decreased range of motion of both hips      Physician: Self, Aaareferral    Physician Orders: Eval and Treat  Medical Diagnosis: Back pain  Hip pain  Surgical Diagnosis: Not applicable for this Episode   Surgical Date: Not applicable for this Episode  Days Since Last Surgery: Not applicable for this Episode    Visit # / Visits Authorized:  1 / 1  Insurance Authorization Period: 1/1/2025 to 12/31/2025  Date of Evaluation: 7/14/2025  Plan of Care Certification: 7/14/2025 to 9/14/2025     Time In: 0800   Time Out: 0900  Total Time (in minutes): 60   Total Billable Time (in minutes): 60    Intake Outcome Measure for FOTO Survey    Therapist reviewed FOTO scores for Miguel Velasco on 7/14/2025.   FOTO report - see Media section or FOTO account episode details.     Intake Score: Not applicable for this Episode%    Precautions:       Subjective   History of Present Illness  Miguel is a 29 y.o. male who reports to physical therapy with a chief concern of Back Pain.                 History of Present Condition/Illness: Patient reports he has been dealing with back pain since [employer]. Patient reports his pain has progressively worsened over the years. Patient reports in November he had a R knee injury which was diagnosed as a knee and calf sprain. Patient reports he feels like he was compensating with his L leg and this may have started his back pain. Patient reports about a month or two ago is when his back started hurting more. Patient reports his pain is only on the L side. Patient reports he feels that he has shooting pains down into the L leg. Patient reports he thinks that he had this  L leg pain prior to two months ago but not this bad. Patient reports his pain is worse when laying down in the middle of the night or in the morning. Patient reports once he gets up in the morning and moves around he starts to feel better. Patient reports he works out 4-5x a week. Patient stays away from squatting and other exercises that bother his back. Patient reports leg press down not bother him. Patient reports he sometimes gets burning in the lower back with lunging. 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 Patient reports this bothers him a little bit but it doesn't stop him from doing his job. Patient reports he use to be able to crack his back and that would relieve his pain but not anymore. Patient also reports having L hip pain in the front 4-5 months ago. Patient reports he feels stiff in the L hip. Patient reports he is not limited from his L hip pain     Activities of Daily Living  Social history was obtained from Patient.               Previously independent with activities of daily living? Yes     Currently independent with activities of daily living? Yes          Previously independent with instrumental activities of daily living? Yes     Currently independent with instrumental activities of daily living? Yes              Pain     Patient reports a current pain level of 1/10. Pain at best is reported as 1/10. Pain at worst is reported as 6/10.   Location: L lower back and L lower leg (side and back)  Clinical Progression (since onset): Stable  Pain Qualities: Aching, Burning  Pain-Relieving Factors: Medications - over-the-counter, Change in position  Pain-Aggravating Factors: Other (Comment)  Other Pain-Aggravating Factors: laying down and in morning         Employment  Patient does not report that: Does the patient's condition impact their ability to work?             Past Medical History/Physical Systems Review:    Miguel Velasco  has no past medical history on file.    Miguel Velasco  has a past surgical history that includes none.    Miguel has a current medication list which includes the following prescription(s): pantoprazole.    Review of patient's allergies indicates:  No Known Allergies     Objective      Lumbar Range of Motion   Active (deg) Passive (deg) Pain   Flexion 90       Extension 30       Right Lateral Flexion 30       Right Rotation  (WNL)       Left Lateral Flexion 30   Yes   Left Rotation  (WNL)                Hip Range of Motion   Right Hip   Active (deg) Passive (deg) Pain   Flexion 115 120     Extension 10 15     ABduction 40 45     ADduction 20 25     External Rotation 90/90 35 45     External Rotation Prone         Internal Rotation 90/90 20 25     Internal Rotation Prone             Left Hip   Active (deg) Passive (deg) Pain   Flexion 115 120     Extension 10 15     ABduction 40 45     ADduction 20 25     External Rotation 90/90 35 45     External Rotation Prone         Internal Rotation 90/90 20 25     Internal Rotation Prone                            Hip Strength - Planes of Motion   Right Strength Right Pain Left Strength Left  Pain   Flexion (L2) 5   4+     Extension           ABduction 4+   4     ADduction           Internal Rotation 5   4+     External Rotation 5   4+         Knee Strength   Right Strength Right Pain Left Strength Left  Pain   Flexion (S2) 5   4+     Prone Flexion           Extension (L3) 5   5                Lumbar/Pelvic Girdle Special Tests  Lumbar Tests - Repeated  Negative: Flexion and Extension       Lumbar Tests - SLR and Tension  Positive: Left Passive Straight Leg Raise and Left Crossed Straight Leg Raise  Negative: Right Passive Straight Leg Raise and Right Crossed Straight Leg Raise                          Treatment:     THERAPEUTIC EXERCISES to develop strength, endurance, ROM, and flexibility for 25 minutes including    Education on HEP  and Diagnosis     Static Prone on Elbows  2 sets - 3 minutes hold  Prone Press Up  2 sets - 10 reps - 3 seconds hold  Sidelying Lumbar Rotation Stretch  2  sets - 10 reps  Supine Piriformis Stretch with Foot on Ground  4 sets - 30 seconds hold  Supine Bridge  Level 4 loop 3 sets - 10 reps - 1 seconds hold  Sidelying Hip Abduction  3 sets - 10 reps  Supine 90/90 Sciatic Nerve Glide with Knee Flexion/Extension  2 sets - 10 reps - 2 seconds hold    MANUAL THERAPY TECHNIQUES were applied to lumbar for 0 minutes including:        NEUROMUSCULAR RE-EDUCATION ACTIVITIES to improve Coordination, Kinesthetic, Proprioception, Posture, and Motor Control for 0 minutes.  The following were included:        THERAPEUTIC ACTIVITIES to improve dynamic and functional performance for  minutes including        Assessment & Plan   Assessment  Miguel presents with a condition of Low complexity.   Presentation of Symptoms: Stable       Functional Limitations: Activity tolerance, Completing work/school activities, Pain with ADLs/IADLs, Participating in leisure activities  Impairments: Activity intolerance, Abnormal or restricted range of motion, Impaired physical strength, Lack of appropriate home exercise program, Pain with functional activity    Patient Goal for Therapy (PT): Decrease lower back and L hip pain  Prognosis: Fair  Assessment Details: Patient demonstrates deficits with range of motion and strength that limits pain free ability to participate in work and recreational activities. Patient presents to PT with a chronic history of lower back pain since highschool. Patient has tenderness to L lower back and L nerve tension. Patients symptoms present similarly to potential lumbar disc pathology. Patient progressed with lumbar / hip mobility, nerve flossing, hip/lower ex strengthening. Patient did well today without adverse effects. They would benefit from skilled PT services to normalize kinetic chain mobility, strength, and  function to safely return to their prior level of activity.    Plan  From a physical therapy perspective, the patient would benefit from: Skilled Rehab Services    Planned therapy interventions include: Therapeutic exercise, Therapeutic activities, Neuromuscular re-education, Manual therapy, ADLs/IADLs, and Other (Comment). FDN as needed  Planned modalities to include: Cryotherapy (cold pack), Diathermy, Electrical stimulation - attended, and Electrical stimulation - passive/unattended.        Visit Frequency: 4 times In Total for 8 Weeks.       This plan was discussed with Patient.   Discussion participants: Agreed Upon Plan of Care  Plan details: Every other week          The patient's spiritual, cultural, and educational needs were considered, and the patient is agreeable to the plan of care and goals.     Education  Education was done with Patient. The patient's learning style includes Demonstration, Listening, and Pictures/video. The patient Demonstrates understanding and Verbalizes understanding.         Educated patient on patient diagnosis and prognosis. Educated patient on home program and expectations with physical therapy.       Goals:   Active       Functional outcome       Patient will show a significant change in FOTO patient-reported outcome tool to demonstrate subjective improvement       Start:  07/14/25    Expected End:  09/14/25            Patient stated goal: Decrease lower back and L hip pain        Start:  07/14/25    Expected End:  09/14/25            Patient will demonstrate independence in home program for support of progression       Start:  07/14/25    Expected End:  09/14/25               Pain       Patient will report pain of 0/10 demonstrating a reduction of overall pain       Start:  07/14/25    Expected End:  09/14/25               Range of Motion       Patient will achieve left pain free spinal side bending ROM to WNL       Start:  07/14/25    Expected End:  09/14/25                Strength       Patient will achieve left LE strength of 5/5       Start:  07/14/25    Expected End:  09/14/25                Sky Butcher PT, DPT